# Patient Record
Sex: MALE | Race: WHITE | NOT HISPANIC OR LATINO | Employment: FULL TIME | URBAN - METROPOLITAN AREA
[De-identification: names, ages, dates, MRNs, and addresses within clinical notes are randomized per-mention and may not be internally consistent; named-entity substitution may affect disease eponyms.]

---

## 2024-03-27 ENCOUNTER — APPOINTMENT (OUTPATIENT)
Dept: RADIOLOGY | Facility: HOSPITAL | Age: 34
End: 2024-03-27
Payer: MEDICAID

## 2024-03-27 ENCOUNTER — HOSPITAL ENCOUNTER (EMERGENCY)
Facility: HOSPITAL | Age: 34
Discharge: HOME | End: 2024-03-27
Payer: MEDICAID

## 2024-03-27 VITALS
WEIGHT: 222.8 LBS | HEIGHT: 72 IN | TEMPERATURE: 97.9 F | BODY MASS INDEX: 30.18 KG/M2 | DIASTOLIC BLOOD PRESSURE: 83 MMHG | OXYGEN SATURATION: 100 % | SYSTOLIC BLOOD PRESSURE: 120 MMHG | HEART RATE: 68 BPM | RESPIRATION RATE: 16 BRPM

## 2024-03-27 DIAGNOSIS — M54.50 ACUTE BILATERAL LOW BACK PAIN WITHOUT SCIATICA: Primary | ICD-10-CM

## 2024-03-27 PROCEDURE — 2500000005 HC RX 250 GENERAL PHARMACY W/O HCPCS

## 2024-03-27 PROCEDURE — 72110 X-RAY EXAM L-2 SPINE 4/>VWS: CPT

## 2024-03-27 PROCEDURE — 72110 X-RAY EXAM L-2 SPINE 4/>VWS: CPT | Performed by: RADIOLOGY

## 2024-03-27 PROCEDURE — 2500000004 HC RX 250 GENERAL PHARMACY W/ HCPCS (ALT 636 FOR OP/ED)

## 2024-03-27 PROCEDURE — 99283 EMERGENCY DEPT VISIT LOW MDM: CPT

## 2024-03-27 PROCEDURE — 96372 THER/PROPH/DIAG INJ SC/IM: CPT

## 2024-03-27 RX ORDER — ORPHENADRINE CITRATE 30 MG/ML
60 INJECTION INTRAMUSCULAR; INTRAVENOUS ONCE
Status: COMPLETED | OUTPATIENT
Start: 2024-03-27 | End: 2024-03-27

## 2024-03-27 RX ORDER — LIDOCAINE 560 MG/1
1 PATCH PERCUTANEOUS; TOPICAL; TRANSDERMAL ONCE
Status: DISCONTINUED | OUTPATIENT
Start: 2024-03-27 | End: 2024-03-27 | Stop reason: HOSPADM

## 2024-03-27 RX ORDER — PREDNISONE 20 MG/1
50 TABLET ORAL DAILY
Qty: 13 TABLET | Refills: 0 | Status: SHIPPED | OUTPATIENT
Start: 2024-03-27 | End: 2024-04-01

## 2024-03-27 RX ORDER — METHOCARBAMOL 500 MG/1
500 TABLET, FILM COATED ORAL 2 TIMES DAILY
Qty: 20 TABLET | Refills: 0 | Status: SHIPPED | OUTPATIENT
Start: 2024-03-27 | End: 2024-04-06

## 2024-03-27 RX ORDER — KETOROLAC TROMETHAMINE 30 MG/ML
30 INJECTION, SOLUTION INTRAMUSCULAR; INTRAVENOUS ONCE
Status: COMPLETED | OUTPATIENT
Start: 2024-03-27 | End: 2024-03-27

## 2024-03-27 RX ORDER — LIDOCAINE 50 MG/G
1 PATCH TOPICAL DAILY
Qty: 6 PATCH | Refills: 0 | Status: SHIPPED | OUTPATIENT
Start: 2024-03-27

## 2024-03-27 RX ADMIN — KETOROLAC TROMETHAMINE 30 MG: 30 INJECTION, SOLUTION INTRAMUSCULAR at 08:42

## 2024-03-27 RX ADMIN — ORPHENADRINE CITRATE 60 MG: 60 INJECTION INTRAMUSCULAR; INTRAVENOUS at 08:37

## 2024-03-27 RX ADMIN — LIDOCAINE 1 PATCH: 4 PATCH TOPICAL at 08:37

## 2024-03-27 ASSESSMENT — LIFESTYLE VARIABLES
EVER FELT BAD OR GUILTY ABOUT YOUR DRINKING: NO
HAVE PEOPLE ANNOYED YOU BY CRITICIZING YOUR DRINKING: NO
TOTAL SCORE: 0
HAVE YOU EVER FELT YOU SHOULD CUT DOWN ON YOUR DRINKING: NO
EVER HAD A DRINK FIRST THING IN THE MORNING TO STEADY YOUR NERVES TO GET RID OF A HANGOVER: NO

## 2024-03-27 ASSESSMENT — PAIN SCALES - GENERAL: PAINLEVEL_OUTOF10: 10 - WORST POSSIBLE PAIN

## 2024-03-27 ASSESSMENT — PAIN DESCRIPTION - LOCATION: LOCATION: BACK

## 2024-03-27 ASSESSMENT — PAIN - FUNCTIONAL ASSESSMENT: PAIN_FUNCTIONAL_ASSESSMENT: 0-10

## 2024-03-27 NOTE — ED PROVIDER NOTES
HPI   Chief Complaint   Patient presents with    Back Pain     Patient states 3 days ago he was laying in bed and sneezed then he felt a pop in his mid/lower back, states goes down his right leg as well.        Patient is a 34-year-old male presenting to the emergency department for evaluation of low back pain.  Patient states pain started 3 days ago.  He states he sneezed and heard a pop and has had low back pain ever since.  States he has pain with any movement of his low back.  Nothing makes it better or worse.  He states has happened to him in the past.  Denies any significant trauma or injury.  He denies bowel or bladder incontinence.  He denies saddle anesthesias.  Denies chest pain, shortness of breath, fever, chills, nausea, vomiting abdominal pain, recent travel, recent illness, cough, congestion, headaches, numbness, tingling, hematuria, dysuria, constipation, diarrhea.                          Kendall Coma Scale Score: 15                     Patient History   Past Medical History:   Diagnosis Date    Personal history of (healed) traumatic fracture     History of fracture of finger    Personal history of other diseases of the respiratory system     History of bronchitis     Past Surgical History:   Procedure Laterality Date    OTHER SURGICAL HISTORY  06/27/2018    Open Treatment Of Fracture Of One Finger Joint    TONSILLECTOMY  06/27/2018    Tonsillectomy With Adenoidectomy     No family history on file.  Social History     Tobacco Use    Smoking status: Not on file    Smokeless tobacco: Not on file   Substance Use Topics    Alcohol use: Not on file    Drug use: Not on file       Physical Exam   ED Triage Vitals [03/27/24 0803]   Temperature Heart Rate Respirations BP   36.6 °C (97.9 °F) 70 16 124/90      Pulse Ox Temp Source Heart Rate Source Patient Position   100 % Temporal Monitor Sitting      BP Location FiO2 (%)     Left arm --       Physical Exam  Vitals and nursing note reviewed.   Constitutional:        General: He is not in acute distress.     Appearance: Normal appearance. He is not ill-appearing or toxic-appearing.   HENT:      Head: Normocephalic and atraumatic.      Nose: Nose normal.      Mouth/Throat:      Mouth: Mucous membranes are moist.   Cardiovascular:      Rate and Rhythm: Normal rate.   Pulmonary:      Effort: Pulmonary effort is normal.      Breath sounds: Normal breath sounds.   Abdominal:      General: Abdomen is flat.      Palpations: Abdomen is soft.   Musculoskeletal:         General: Tenderness (Lumbar spine and paraspinals with no step-offs or deformities.  Nontender down the thoracic or sacral spine midline.) present. No signs of injury.      Cervical back: Normal range of motion. No tenderness.   Skin:     General: Skin is warm and dry.   Neurological:      General: No focal deficit present.      Mental Status: He is alert and oriented to person, place, and time.      Sensory: No sensory deficit.      Motor: No weakness.   Psychiatric:         Mood and Affect: Mood normal.         Behavior: Behavior normal.         ED Course & MDM   Diagnoses as of 03/27/24 0913   Acute bilateral low back pain without sciatica       Medical Decision Making  **Disclaimer parts of this chart have been completed using voice recognition software. Please excuse any errors of transcription.     Evaluated this patient independently and my supervising physician was available for consultation.    HPI: Detailed above.    Exam: A medically appropriate exam performed, outlined above, given the known history and presentation.    History obtained from: Patient    Labs/Diagnostics:  XR lumbar spine complete 4+ views   Final Result   1. Unremarkable lumbar spine radiographs.             MACRO:   None.        Signed by: Franklin Potts 3/27/2024 9:06 AM   Dictation workstation:   [id number]        EMERGENCY DEPARTMENT COURSE and DIFFERENTIAL DIAGNOSIS/MDM:  Patient is a 34-year-old male presenting to the emergency  department for evaluation of low back pain.  On physical exam vital signs remarkable for hypertension but otherwise stable patient is in no acute distress.  He has not tenderness to palpation down the lumbar spine midline and lumbar paraspinals with no obvious step-offs or deformities.  He also has pain with range of motion of the including flexion and extension.  He denies any saddle anesthesias or bowel or bladder incontinence.  Due to the midline tenderness x-ray lumbar spine ordered as well as lidocaine patch, Toradol, and Norflex.  X-ray of the lumbar spine showed unremarkable lumbar spine radiographs.  Patient feeling better after medications.  Patient was discharged with a prescription for Robaxin, lidocaine patches, and prednisone.  He was advised to follow-up with primary care physician as well as orthospine outpatient within the next 1 to 2 days.  He will return to the emergency department with any new or worsening symptoms.    I utilized an evidence-based risk rating tool (CMT) along with my training and experience to weigh the risk of discharge against the risks of further testing, imaging, or hospitalization. At this time I estimate the risks of additional testing, imaging, or hospitalization to be equal to or greater than the risk of discharge. I discussed my risk assessment with the patient and the patient consents to the risk of discharge as well as the risk of uncertainty in estimating outcomes. Given the symptoms and findings present at this time, the chance of SEA or SCC is so remote that additional testing or imaging is more likely to harm the patient than diagnose SEA. [id number]         Vitals:    Vitals:    03/27/24 0803   BP: 124/90   BP Location: Left arm   Patient Position: Sitting   Pulse: 70   Resp: 16   Temp: 36.6 °C (97.9 °F)   TempSrc: Temporal   SpO2: 100%   Weight: 101 kg (222 lb 12.8 oz)   Height: 1.829 m (6')       History Limited by:    None    Independent history obtained  from:    None      External records reviewed:    None      Diagnostics interpreted by me:    Xrays - see my independent interpretation in MDM      Discussions with other clinicians:    None      Chronic conditions impacting care:    None      Social determinants of health affecting care:    None    Diagnostic tests considered but not performed: None    ED Medications managed:    Medications   lidocaine 4 % patch 1 patch (1 patch transdermal Medication Applied 3/27/24 0837)   ketorolac (Toradol) injection 30 mg (30 mg intramuscular Given 3/27/24 0842)   orphenadrine (Norflex) injection 60 mg (60 mg intramuscular Given 3/27/24 0837)     Prescription drugs considered:    Pain Medications Robaxin, lidocaine patch, prednisone    Procedure  Procedures     Madalyn Allen PA-C  03/27/24 0943

## 2024-03-27 NOTE — Clinical Note
Brandon Gomez was seen and treated in our emergency department on 3/27/2024.  He may return to work on 03/29/2024.       If you have any questions or concerns, please don't hesitate to call.      Madalyn Allen PA-C